# Patient Record
Sex: FEMALE | URBAN - METROPOLITAN AREA
[De-identification: names, ages, dates, MRNs, and addresses within clinical notes are randomized per-mention and may not be internally consistent; named-entity substitution may affect disease eponyms.]

---

## 2023-02-16 ENCOUNTER — OFFICE (OUTPATIENT)
Dept: URBAN - METROPOLITAN AREA CLINIC 26 | Facility: CLINIC | Age: 88
End: 2023-02-16

## 2023-02-16 VITALS
TEMPERATURE: 98.3 F | DIASTOLIC BLOOD PRESSURE: 76 MMHG | SYSTOLIC BLOOD PRESSURE: 150 MMHG | WEIGHT: 146 LBS | HEIGHT: 64 IN | HEART RATE: 71 BPM

## 2023-02-16 DIAGNOSIS — D62 ACUTE POSTHEMORRHAGIC ANEMIA: ICD-10-CM

## 2023-02-16 DIAGNOSIS — K44.9 DIAPHRAGMATIC HERNIA WITHOUT OBSTRUCTION OR GANGRENE: ICD-10-CM

## 2023-02-16 DIAGNOSIS — K22.6 GASTRO-ESOPHAGEAL LACERATION-HEMORRHAGE SYNDROME: ICD-10-CM

## 2023-02-16 PROCEDURE — 99204 OFFICE O/P NEW MOD 45 MIN: CPT | Performed by: INTERNAL MEDICINE

## 2023-04-05 LAB
ANION GAP IN SER/PLAS: 10 MMOL/L (ref 10–20)
CALCIUM (MG/DL) IN SER/PLAS: 9.4 MG/DL (ref 8.6–10.3)
CARBON DIOXIDE, TOTAL (MMOL/L) IN SER/PLAS: 27 MMOL/L (ref 21–32)
CHLORIDE (MMOL/L) IN SER/PLAS: 93 MMOL/L (ref 98–107)
CREATININE (MG/DL) IN SER/PLAS: 0.85 MG/DL (ref 0.5–1.05)
ERYTHROCYTE DISTRIBUTION WIDTH (RATIO) BY AUTOMATED COUNT: 12.4 % (ref 11.5–14.5)
ERYTHROCYTE MEAN CORPUSCULAR HEMOGLOBIN CONCENTRATION (G/DL) BY AUTOMATED: 32.6 G/DL (ref 32–36)
ERYTHROCYTE MEAN CORPUSCULAR VOLUME (FL) BY AUTOMATED COUNT: 91 FL (ref 80–100)
ERYTHROCYTES (10*6/UL) IN BLOOD BY AUTOMATED COUNT: 4.19 X10E12/L (ref 4–5.2)
GFR FEMALE: 66 ML/MIN/1.73M2
GLUCOSE (MG/DL) IN SER/PLAS: 92 MG/DL (ref 74–99)
HEMATOCRIT (%) IN BLOOD BY AUTOMATED COUNT: 38 % (ref 36–46)
HEMOGLOBIN (G/DL) IN BLOOD: 12.4 G/DL (ref 12–16)
LEUKOCYTES (10*3/UL) IN BLOOD BY AUTOMATED COUNT: 8.5 X10E9/L (ref 4.4–11.3)
PLATELETS (10*3/UL) IN BLOOD AUTOMATED COUNT: 264 X10E9/L (ref 150–450)
POTASSIUM (MMOL/L) IN SER/PLAS: 4.4 MMOL/L (ref 3.5–5.3)
SODIUM (MMOL/L) IN SER/PLAS: 126 MMOL/L (ref 136–145)
UREA NITROGEN (MG/DL) IN SER/PLAS: 17 MG/DL (ref 6–23)

## 2023-04-10 ENCOUNTER — OFFICE (OUTPATIENT)
Dept: URBAN - METROPOLITAN AREA PATHOLOGY 2 | Facility: PATHOLOGY | Age: 88
End: 2023-04-10
Payer: COMMERCIAL

## 2023-04-10 ENCOUNTER — AMBULATORY SURGICAL CENTER (OUTPATIENT)
Dept: URBAN - METROPOLITAN AREA SURGERY 12 | Facility: SURGERY | Age: 88
End: 2023-04-10
Payer: COMMERCIAL

## 2023-04-10 VITALS
SYSTOLIC BLOOD PRESSURE: 180 MMHG | DIASTOLIC BLOOD PRESSURE: 82 MMHG | HEART RATE: 68 BPM | HEIGHT: 64 IN | HEART RATE: 73 BPM | SYSTOLIC BLOOD PRESSURE: 151 MMHG | DIASTOLIC BLOOD PRESSURE: 80 MMHG | HEART RATE: 68 BPM | SYSTOLIC BLOOD PRESSURE: 151 MMHG | SYSTOLIC BLOOD PRESSURE: 180 MMHG | DIASTOLIC BLOOD PRESSURE: 82 MMHG | OXYGEN SATURATION: 93 % | HEART RATE: 65 BPM | SYSTOLIC BLOOD PRESSURE: 163 MMHG | TEMPERATURE: 97.3 F | OXYGEN SATURATION: 96 % | SYSTOLIC BLOOD PRESSURE: 140 MMHG | OXYGEN SATURATION: 99 % | RESPIRATION RATE: 18 BRPM | OXYGEN SATURATION: 96 % | RESPIRATION RATE: 12 BRPM | DIASTOLIC BLOOD PRESSURE: 95 MMHG | DIASTOLIC BLOOD PRESSURE: 95 MMHG | RESPIRATION RATE: 27 BRPM | HEART RATE: 71 BPM | RESPIRATION RATE: 12 BRPM | RESPIRATION RATE: 18 BRPM | SYSTOLIC BLOOD PRESSURE: 163 MMHG | DIASTOLIC BLOOD PRESSURE: 95 MMHG | HEART RATE: 71 BPM | OXYGEN SATURATION: 96 % | WEIGHT: 144 LBS | OXYGEN SATURATION: 98 % | OXYGEN SATURATION: 98 % | HEART RATE: 68 BPM | DIASTOLIC BLOOD PRESSURE: 82 MMHG | OXYGEN SATURATION: 99 % | HEART RATE: 65 BPM | OXYGEN SATURATION: 98 % | TEMPERATURE: 97.3 F | HEART RATE: 65 BPM | HEIGHT: 64 IN | DIASTOLIC BLOOD PRESSURE: 71 MMHG | OXYGEN SATURATION: 93 % | HEIGHT: 64 IN | RESPIRATION RATE: 19 BRPM | OXYGEN SATURATION: 93 % | DIASTOLIC BLOOD PRESSURE: 71 MMHG | SYSTOLIC BLOOD PRESSURE: 140 MMHG | WEIGHT: 144 LBS | DIASTOLIC BLOOD PRESSURE: 80 MMHG | DIASTOLIC BLOOD PRESSURE: 114 MMHG | HEART RATE: 86 BPM | WEIGHT: 144 LBS | RESPIRATION RATE: 12 BRPM | DIASTOLIC BLOOD PRESSURE: 71 MMHG | RESPIRATION RATE: 19 BRPM | HEART RATE: 86 BPM | RESPIRATION RATE: 27 BRPM | HEART RATE: 73 BPM | RESPIRATION RATE: 20 BRPM | DIASTOLIC BLOOD PRESSURE: 80 MMHG | SYSTOLIC BLOOD PRESSURE: 158 MMHG | RESPIRATION RATE: 19 BRPM | SYSTOLIC BLOOD PRESSURE: 158 MMHG | HEART RATE: 71 BPM | RESPIRATION RATE: 20 BRPM | DIASTOLIC BLOOD PRESSURE: 114 MMHG | HEART RATE: 86 BPM | DIASTOLIC BLOOD PRESSURE: 114 MMHG | SYSTOLIC BLOOD PRESSURE: 163 MMHG | TEMPERATURE: 97.3 F | OXYGEN SATURATION: 99 % | SYSTOLIC BLOOD PRESSURE: 180 MMHG | SYSTOLIC BLOOD PRESSURE: 158 MMHG | RESPIRATION RATE: 18 BRPM | RESPIRATION RATE: 20 BRPM | HEART RATE: 73 BPM | SYSTOLIC BLOOD PRESSURE: 140 MMHG | SYSTOLIC BLOOD PRESSURE: 151 MMHG | RESPIRATION RATE: 27 BRPM

## 2023-04-10 DIAGNOSIS — K21.00 GASTRO-ESOPHAGEAL REFLUX DISEASE WITH ESOPHAGITIS, WITHOUT B: ICD-10-CM

## 2023-04-10 DIAGNOSIS — K31.89 OTHER DISEASES OF STOMACH AND DUODENUM: ICD-10-CM

## 2023-04-10 DIAGNOSIS — K22.6 GASTRO-ESOPHAGEAL LACERATION-HEMORRHAGE SYNDROME: ICD-10-CM

## 2023-04-10 DIAGNOSIS — K44.9 DIAPHRAGMATIC HERNIA WITHOUT OBSTRUCTION OR GANGRENE: ICD-10-CM

## 2023-04-10 PROBLEM — K22.89 OTHER SPECIFIED DISEASE OF ESOPHAGUS: Status: ACTIVE | Noted: 2023-04-10

## 2023-04-10 PROCEDURE — 88342 IMHCHEM/IMCYTCHM 1ST ANTB: CPT | Mod: TC | Performed by: PATHOLOGY

## 2023-04-10 PROCEDURE — 88313 SPECIAL STAINS GROUP 2: CPT | Mod: TC | Performed by: PATHOLOGY

## 2023-04-10 PROCEDURE — 88305 TISSUE EXAM BY PATHOLOGIST: CPT | Mod: TC | Performed by: PATHOLOGY

## 2023-04-10 PROCEDURE — 43239 EGD BIOPSY SINGLE/MULTIPLE: CPT | Performed by: INTERNAL MEDICINE

## 2023-08-14 ENCOUNTER — LAB (OUTPATIENT)
Dept: LAB | Facility: LAB | Age: 88
End: 2023-08-14
Payer: MEDICARE

## 2023-08-14 LAB
ALBUMIN (G/DL) IN SER/PLAS: 4.2 G/DL (ref 3.4–5)
ANION GAP IN SER/PLAS: 14 MMOL/L (ref 10–20)
CALCIUM (MG/DL) IN SER/PLAS: 9.9 MG/DL (ref 8.6–10.6)
CARBON DIOXIDE, TOTAL (MMOL/L) IN SER/PLAS: 26 MMOL/L (ref 21–32)
CHLORIDE (MMOL/L) IN SER/PLAS: 94 MMOL/L (ref 98–107)
CREATININE (MG/DL) IN SER/PLAS: 0.87 MG/DL (ref 0.5–1.05)
GFR FEMALE: 64 ML/MIN/1.73M2
GLUCOSE (MG/DL) IN SER/PLAS: 91 MG/DL (ref 74–99)
PHOSPHATE (MG/DL) IN SER/PLAS: 4.7 MG/DL (ref 2.5–4.9)
POTASSIUM (MMOL/L) IN SER/PLAS: 4.8 MMOL/L (ref 3.5–5.3)
SODIUM (MMOL/L) IN SER/PLAS: 129 MMOL/L (ref 136–145)
UREA NITROGEN (MG/DL) IN SER/PLAS: 16 MG/DL (ref 6–23)

## 2023-09-05 ENCOUNTER — TELEPHONE (OUTPATIENT)
Dept: PRIMARY CARE | Facility: CLINIC | Age: 88
End: 2023-09-05

## 2023-09-05 DIAGNOSIS — I10 PRIMARY HYPERTENSION: Primary | ICD-10-CM

## 2023-09-05 PROBLEM — G72.9 MYOPATHY: Status: ACTIVE | Noted: 2023-09-05

## 2023-09-05 PROBLEM — L30.9 DERMATITIS, UNSPECIFIED: Status: ACTIVE | Noted: 2019-11-26

## 2023-09-05 PROBLEM — R73.9 ELEVATED BLOOD SUGAR: Status: ACTIVE | Noted: 2023-09-05

## 2023-09-05 PROBLEM — K22.6 MALLORY-WEISS TEAR: Status: ACTIVE | Noted: 2023-09-05

## 2023-09-05 PROBLEM — R21 RASH AND OTHER NONSPECIFIC SKIN ERUPTION: Status: ACTIVE | Noted: 2019-11-26

## 2023-09-05 PROBLEM — M19.019 ARTHRITIS OF SHOULDER REGION: Status: ACTIVE | Noted: 2023-09-05

## 2023-09-05 PROBLEM — M25.561 RIGHT KNEE PAIN: Status: ACTIVE | Noted: 2023-09-05

## 2023-09-05 PROBLEM — M85.80 OSTEOPENIA: Status: ACTIVE | Noted: 2023-09-05

## 2023-09-05 PROBLEM — M19.049 OSTEOARTHRITIS, HAND: Status: ACTIVE | Noted: 2023-09-05

## 2023-09-05 PROBLEM — R76.8 POSITIVE ANA (ANTINUCLEAR ANTIBODY): Status: ACTIVE | Noted: 2023-09-05

## 2023-09-05 PROBLEM — G62.9 PERIPHERAL POLYNEUROPATHY: Status: ACTIVE | Noted: 2023-09-05

## 2023-09-05 PROBLEM — S52.613A FRACTURE OF ULNAR STYLOID: Status: ACTIVE | Noted: 2022-01-28

## 2023-09-05 PROBLEM — J45.909 ASTHMA (HHS-HCC): Status: ACTIVE | Noted: 2023-09-05

## 2023-09-05 PROBLEM — E78.5 HYPERLIPIDEMIA: Status: ACTIVE | Noted: 2023-09-05

## 2023-09-05 PROBLEM — I48.0 PAROXYSMAL ATRIAL FIBRILLATION (MULTI): Status: ACTIVE | Noted: 2023-09-05

## 2023-09-05 PROBLEM — J30.9 ALLERGIC RHINITIS: Status: ACTIVE | Noted: 2023-09-05

## 2023-09-05 PROBLEM — K21.9 GASTROESOPHAGEAL REFLUX DISEASE: Status: ACTIVE | Noted: 2023-09-05

## 2023-09-05 PROBLEM — M17.0 PRIMARY OSTEOARTHRITIS OF BOTH KNEES: Status: ACTIVE | Noted: 2023-09-05

## 2023-09-05 PROBLEM — S52.509A DISTAL RADIAL FRACTURE: Status: ACTIVE | Noted: 2022-01-28

## 2023-09-05 PROBLEM — N18.30 CKD (CHRONIC KIDNEY DISEASE), STAGE III (MULTI): Status: ACTIVE | Noted: 2023-09-05

## 2023-09-05 PROBLEM — D64.9 ANEMIA: Status: ACTIVE | Noted: 2023-09-05

## 2023-09-05 RX ORDER — METOPROLOL SUCCINATE 25 MG/1
TABLET, EXTENDED RELEASE ORAL 2 TIMES DAILY
COMMUNITY
Start: 2020-02-10 | End: 2024-01-11 | Stop reason: SDUPTHER

## 2023-09-05 RX ORDER — LISINOPRIL 10 MG/1
1 TABLET ORAL DAILY
COMMUNITY
Start: 2019-10-30 | End: 2023-09-05 | Stop reason: SDUPTHER

## 2023-09-05 RX ORDER — LISINOPRIL 10 MG/1
10 TABLET ORAL DAILY
Qty: 90 TABLET | Refills: 3 | Status: SHIPPED | OUTPATIENT
Start: 2023-09-05 | End: 2024-09-04

## 2023-09-05 RX ORDER — CALCIUM CARBONATE 600 MG
TABLET ORAL
COMMUNITY
Start: 2017-10-10

## 2023-09-05 RX ORDER — ASPIRIN 81 MG/1
1 TABLET ORAL DAILY
COMMUNITY
Start: 2017-10-10 | End: 2025-04-06

## 2023-09-05 RX ORDER — CHOLECALCIFEROL (VITAMIN D3) 125 MCG
CAPSULE ORAL
COMMUNITY
Start: 2020-03-30

## 2023-09-05 RX ORDER — CLOPIDOGREL BISULFATE 75 MG/1
75 TABLET ORAL DAILY
COMMUNITY
End: 2024-01-22 | Stop reason: ALTCHOICE

## 2023-09-05 RX ORDER — PANTOPRAZOLE SODIUM 40 MG/1
1 TABLET, DELAYED RELEASE ORAL DAILY
COMMUNITY

## 2023-09-05 RX ORDER — APIXABAN 2.5 MG/1
2.5 TABLET, FILM COATED ORAL 2 TIMES DAILY
COMMUNITY
End: 2023-09-19 | Stop reason: WASHOUT

## 2023-09-05 NOTE — TELEPHONE ENCOUNTER
Pt called for a med refill. Pt needs a refill on her lisinopril. Pt uses [facility]. Pt's next office visit 9/19/23. Pt's chart need abstracted.

## 2023-09-18 NOTE — PROGRESS NOTES
"Subjective   Patient ID: Camille Hatfield is a 88 y.o. female who presents for Follow-up (Pt presents for 6 month follow up- pt states no concerns at this time.) and Medicare Annual Wellness Visit Subsequent (Pt presents for MWV- ABN given to pt and signed, pt verbalized understanding.).    HPI     Patient presents today for routine FUV + MWV  Patient is doing well overall, other than some leg discomfort.    She experiences the leg discomfort mostly at night.  Diagnosed with neuropathy about 4-5 years ago, unsure etiology, possibly idiopathic.  Describes as burning sensation, feels \"like she is on fire\".  She does exercises regularly.  She tried gabapentin in the past but was not effective and caused some sedation.  She would like something to \"help\" this not just hide it.  Some nights she can sleep without issues.  She has never tried amitriptyline, duloxetine, or Lyrica.  She would rather take something for sleep if needed and not the pain.    MEDICARE WELLNESS VISIT   TDAP: none found  SHINGRIX: none found  PNEUMOVAX: completed  PAP: none found - graduated  MAMMO: none found - graduated  CSCOPE: none found - graduated  DEXA: 4/2018 osteopenia bilateral femoral necks (ordered 9/2023)  HEP C SCREEN: none found  CACS: none found  LIPID: 1/2020 TC/HDL ratio 5.4,     Diet: eats lots of veggies  Exercise: does some sort of activity or exercise everyday, uses her counter for support.    Dental visits up to date.  Vision screening up to date.    Patient has living will and POA.   She does have HC-POA, this is one of her sons.  She is DNR, has discussed her wishes with her family.    Patient declines influenza vaccine.  Patient to inquire at pharmacy about Shingrix vaccine.  Patient to inquire at pharmacy about TDAP vaccine.    Bone density screening:   Hx of osteoporosis, most recent DEXA showed osteopenia, see below.  Patient is supplementing calcium or vitamin D.    Breast cancer screening: GRADUATED  Denies " lumps/bumps, skin changes, nipple retraction, or nipple drainage.    Cervical cancer screening: GRADUATED  Denies pelvic pain, vaginal discharge, or vaginal bleeding.    Colon cancer screening: GRADUATED  Denies melena, hematochezia, constipation, diarrhea, bloating, change in bowel habits.    CHRONIC CONDITIONS:   -Hypokalemia  8/2023 Na 129, prior Na 126 in 4/2023    -Paroxysmal atrial fibrillation  Managed by cardiology, Dr. Ramicone.  Presently Metoprolol 25 mg BID  Previously on Xarelto and Eliquis, discontinued 1/2023 due to massive GI bleeding requiring 1 unit of blood transfusion.  Her CHADS-VASC score is 4 and HAS-BLED score is 3.   She is at increased risk of both bleeding and stroke.   As such she is a reasonable candidate for consideration of left atrial appendage occluder placement.     4/18/2023 s/p successful Transcatheter Left Atrial Appendage Closure with 27 mm Watchman FLX device.  Recapture was not required.    Compression rate was 17.5%.    2 sheathes were inserted in RFV, and hemostasis was achieved with Vascades x2.   Follow up TTE showed no evidence of new pericardial effusion or device-related thrombus.   Patient will be discharged with DAPT, followed by aspirin for life.   CTA at 4 months is required to reassess device positioning and rule out any device leak or device related thrombus.    Had recheck CT done 8/2023.  To come off Plavix in 2 weeks.  No bleeding but some bruising, nothing concerning.    -HTN  Taking Lisinopril 10 mg + Metoprolol 25 mg BID  She takes all meds in the morning.  States BP is higher in the morning, goes into 90s systolic in PM.  Does get some dizziness with standing, always later in the day.  Highest BP she has gotten at home was in 140s.  Tried cutting down to 1/2 tablet Lisinopril but was still high in the morning.     -HLD  Lifestyle managed, unable to tolerate statins in past.  No CACS in our system.  1/2020 TC/HDL ratio 5.4,      -CKD, stage  III  8/2023 GFR 64, creatinine 0.87, Na 129 (L), K 4.8  No US kidneys in our records.     -Osteopenia  Taking vitamin D and calcium.  Hx of osteoporosis, she was on infusion, Reclast, for about 2 years around 2009/2010.  She was intolerant to Fosamax in remote past, due to GI upset.  Last DEXA done in 2018, patient previously declined further screenings as she would not take treatment medications if needed.    -Bilateral knee pain  Prior x-rays have proven osteoarthritis.  Previously on blood thinner could not use NSAIDs (s/p watchman 4/2023).  Patient does a great deal of stretching and strengthening at home.  6/2022 bilateral knee injections administered  She is taking Tylenol, scheduled dosing and an OTC topical.  No relief with Voltaren gel in past.    Review of Systems   All other systems reviewed and are negative.      Objective   /72 (BP Location: Left arm, Patient Position: Sitting, BP Cuff Size: Small adult)   Pulse 68   Temp 36.5 °C (97.7 °F) (Temporal)   Resp 12   Wt 64.5 kg (142 lb 1.6 oz)   SpO2 97%   BMI 24.39 kg/m²     Physical Exam  Vitals and nursing note reviewed.   Constitutional:       General: She is not in acute distress.     Appearance: Normal appearance. She is not toxic-appearing.   HENT:      Head: Normocephalic and atraumatic.   Neck:      Thyroid: No thyromegaly.   Cardiovascular:      Rate and Rhythm: Normal rate and regular rhythm.      Heart sounds: No murmur heard.     No friction rub. No gallop.   Pulmonary:      Effort: Pulmonary effort is normal.      Breath sounds: Normal breath sounds. No wheezing, rhonchi or rales.   Abdominal:      General: Bowel sounds are normal. There is no distension.      Palpations: Abdomen is soft. There is no mass.      Tenderness: There is no abdominal tenderness. There is no guarding.   Lymphadenopathy:      Cervical: No cervical adenopathy.   Skin:     General: Skin is warm and dry.   Neurological:      General: No focal deficit present.       Mental Status: She is alert and oriented to person, place, and time.   Psychiatric:         Mood and Affect: Mood normal.         Behavior: Behavior normal.         Assessment/Plan   Hypertension  BP is 136/72 in office today, adequate control.  Goal BP is 130/80 or less, ideally 120/80 or less.   Because of some hypotension in the evenings I recommend she split dose the Lisinopril as well as the Metoprolol which she is already doing based on prescription per cardio.    Continue Lisinopril 10 mg daily - take 1/2 tablet (5 mg) in AM and 1/2 tablet (5 mg) in PM.  Continue Metoprolol 25 mg twice daily.    If hypotension persists or hypokalemia worsens will change the Lisinopril to alternative agent.  Repeat BMP ordered today.    Hyperlipidemia  1/2020 TC/HDL ratio 5.4,   Goal TC/HDL ratio 3.4 or less, LDL 99 or less,  or less, and TRIG 150 or less.  Lifestyle managed, unable to tolerate statins in past.  Diet and exercise recommendations revisited, see wellness A/P.    Anemia  Anemia 2/2 GI bleed on anticoagulation therapy (this was discontinued in 1/2023).  S/p watchmen in 4/2023 for a-fib.  4/2023 CBC normal, anemia resolved.    Idiopathic peripheral neuropathy  Idopathic neuropathy, not responsive to Gabapentin in the past.  Most bothersome at night, causes insomnia intermittently.  Start trial with Nortriptyline, 30 day prescription sent to local pharmacy.  If tolerated well and effective can refill for 90 days.    Medicare annual wellness visit, subsequent  Vaccines and screenings reviewed.  Questionnaires completed.  Health and wellness topics reviewed.  Diet and exercise recommendations revisited.  Advanced directives reviewed, patient is DNR, has living will and POA, family aware of wishes.  Routine blood work ordered today.    Patient has aged out of routine breast, cervical, and colon cancer screenings, she continues to defer.  Bone density screening (DEXA) discussed, repeat ordered today,  see Osteopenia A/P.    Flu vaccine recommended, defers today.    TDAP booster recommended, patient can get at pharmacy, no prescription needed.    Shingles vaccine (Shingrix) is strongly encourage - can get at pharmacy - no prescription needed.  This should be covered starting January 2023 with Medicare per Executive Order.  This is to be administered in 2 separate doses, 2- 6 months apart.   This is a killed virus vaccine, so no risk of chicken pox or shingles with administration.   The vaccine is approximately 90 % effective to protect against shingles even 5 years out.   Side effects of the vaccine can include soreness of the arm at administration site and possible flu-like symptoms after administration.     LIFESTYLE MEASURES  -make sure you are avoiding refined carbs such as breads, pasta, cereal, candy, soda,  nutrition bars, granola, chips, and sugar sweetened beverages.      -eat 5- 7 servings daily of veggies,  healthy protein such as chicken, fish,  beans, and eggs, and include healthy fats in your diet such as seeds, nuts, olive oil, avocados, and salmon.   -exercise 4 - 6 days per week as you are able, 150 minutes total weekly divided up is recommended.  -Vitamin D is recommended at 1000 - 5000 IU international units daily.   -Always wear sunscreen when you have sun exposure.  -64 oz of water is recommended daily.  -Dental visits recommended every 6 months.  -Eye exam recommended every 2 years, for those with vision problems every year.      Osteopenia  Hx of osteoporosis, she was on infusion, Reclast, for about 2 years around 2009/2010.  She was intolerant to Fosamax in remote past, due to GI upset.  Last DEXA done in 2018, which showed osteopenia but not osteoporosis.  Repeat DEXA ordered today.  Recommend weightbearing exercise such as walking 4-6 days per week 30 minutes on those days.   Other strengthening exercises such as yoga and lifting weights can also be helpful.  Take vitamin D at 1000 IU  daily and calcium at 1500 mg daily.    Paroxysmal atrial fibrillation (CMS/HCC)  Stable, doing well since having the Watchman procedure 4/18/2023.  She continues with Plavix at this time, states she is due to discontinue this in 2 weeks per cardio.  Patient to continue with aspirin life-long.  She should follow-up with cardiology as they have directed.  She should continue the Metoprolol 25 mg BID.    CKD (chronic kidney disease), stage III (CMS/HCC)  8/2023 GFR 64, creatinine 0.87, this is baseline for patient.  Will continue to monitor on regular basis.  Revisited good BP control, avoiding NSAIDs, and adequate hydration as measures to further protect kidneys.     Insomnia due to medical condition  Secondary to neuropathy, will start on trial of Nortriptyline, prescription sent to pharmacy.    Hypokalemia  Repeat BMP ordered today, if Na <125 will need to change the Lisinopril.  Patient will be contacted with results.   Follow-up 3-4 months for recheck neuropathy/lab review.  Labs to be done prior.  Call for sooner follow-up if needed.     Scribe Attestation  By signing my name below, Kortney LÓPEZ Scribe   attest that this documentation has been prepared under the direction and in the presence of Arelis Joyner DO.

## 2023-09-19 ENCOUNTER — OFFICE VISIT (OUTPATIENT)
Dept: PRIMARY CARE | Facility: CLINIC | Age: 88
End: 2023-09-19
Payer: MEDICARE

## 2023-09-19 VITALS
DIASTOLIC BLOOD PRESSURE: 72 MMHG | WEIGHT: 142.1 LBS | SYSTOLIC BLOOD PRESSURE: 136 MMHG | BODY MASS INDEX: 24.39 KG/M2 | RESPIRATION RATE: 12 BRPM | HEART RATE: 68 BPM | OXYGEN SATURATION: 97 % | TEMPERATURE: 97.7 F

## 2023-09-19 DIAGNOSIS — M85.80 OSTEOPENIA, UNSPECIFIED LOCATION: ICD-10-CM

## 2023-09-19 DIAGNOSIS — I10 HYPERTENSION, UNSPECIFIED TYPE: ICD-10-CM

## 2023-09-19 DIAGNOSIS — E87.6 HYPOKALEMIA: ICD-10-CM

## 2023-09-19 DIAGNOSIS — E78.5 HYPERLIPIDEMIA, UNSPECIFIED HYPERLIPIDEMIA TYPE: ICD-10-CM

## 2023-09-19 DIAGNOSIS — G47.01 INSOMNIA DUE TO MEDICAL CONDITION: ICD-10-CM

## 2023-09-19 DIAGNOSIS — G60.9 IDIOPATHIC PERIPHERAL NEUROPATHY: ICD-10-CM

## 2023-09-19 DIAGNOSIS — Z00.00 MEDICARE ANNUAL WELLNESS VISIT, SUBSEQUENT: Primary | ICD-10-CM

## 2023-09-19 DIAGNOSIS — Z78.0 POSTMENOPAUSAL ESTROGEN DEFICIENCY: ICD-10-CM

## 2023-09-19 DIAGNOSIS — N18.31 STAGE 3A CHRONIC KIDNEY DISEASE (MULTI): ICD-10-CM

## 2023-09-19 DIAGNOSIS — I48.0 PAROXYSMAL ATRIAL FIBRILLATION (MULTI): ICD-10-CM

## 2023-09-19 DIAGNOSIS — Z13.89 ENCOUNTER FOR SCREENING FOR OTHER DISORDER: ICD-10-CM

## 2023-09-19 PROBLEM — R21 RASH AND OTHER NONSPECIFIC SKIN ERUPTION: Status: RESOLVED | Noted: 2019-11-26 | Resolved: 2023-09-19

## 2023-09-19 PROBLEM — L30.9 DERMATITIS, UNSPECIFIED: Status: RESOLVED | Noted: 2019-11-26 | Resolved: 2023-09-19

## 2023-09-19 PROBLEM — D64.9 ANEMIA: Status: RESOLVED | Noted: 2023-09-05 | Resolved: 2023-09-19

## 2023-09-19 PROBLEM — G72.9 MYOPATHY: Status: RESOLVED | Noted: 2023-09-05 | Resolved: 2023-09-19

## 2023-09-19 PROCEDURE — G0444 DEPRESSION SCREEN ANNUAL: HCPCS | Performed by: FAMILY MEDICINE

## 2023-09-19 PROCEDURE — 1160F RVW MEDS BY RX/DR IN RCRD: CPT | Performed by: FAMILY MEDICINE

## 2023-09-19 PROCEDURE — 3075F SYST BP GE 130 - 139MM HG: CPT | Performed by: FAMILY MEDICINE

## 2023-09-19 PROCEDURE — 1159F MED LIST DOCD IN RCRD: CPT | Performed by: FAMILY MEDICINE

## 2023-09-19 PROCEDURE — 99213 OFFICE O/P EST LOW 20 MIN: CPT | Performed by: FAMILY MEDICINE

## 2023-09-19 PROCEDURE — 1170F FXNL STATUS ASSESSED: CPT | Performed by: FAMILY MEDICINE

## 2023-09-19 PROCEDURE — G0439 PPPS, SUBSEQ VISIT: HCPCS | Performed by: FAMILY MEDICINE

## 2023-09-19 PROCEDURE — 3078F DIAST BP <80 MM HG: CPT | Performed by: FAMILY MEDICINE

## 2023-09-19 PROCEDURE — 1036F TOBACCO NON-USER: CPT | Performed by: FAMILY MEDICINE

## 2023-09-19 PROCEDURE — 1126F AMNT PAIN NOTED NONE PRSNT: CPT | Performed by: FAMILY MEDICINE

## 2023-09-19 RX ORDER — NORTRIPTYLINE HYDROCHLORIDE 10 MG/1
10 CAPSULE ORAL NIGHTLY
Qty: 30 CAPSULE | Refills: 5 | Status: SHIPPED | OUTPATIENT
Start: 2023-09-19 | End: 2024-03-17

## 2023-09-19 ASSESSMENT — PATIENT HEALTH QUESTIONNAIRE - PHQ9
1. LITTLE INTEREST OR PLEASURE IN DOING THINGS: NOT AT ALL
2. FEELING DOWN, DEPRESSED OR HOPELESS: NOT AT ALL
SUM OF ALL RESPONSES TO PHQ9 QUESTIONS 1 AND 2: 0

## 2023-09-19 ASSESSMENT — ACTIVITIES OF DAILY LIVING (ADL)
GROCERY_SHOPPING: INDEPENDENT
TAKING_MEDICATION: INDEPENDENT
BATHING: INDEPENDENT
DOING_HOUSEWORK: INDEPENDENT
DRESSING: INDEPENDENT
MANAGING_FINANCES: INDEPENDENT

## 2023-09-19 NOTE — ASSESSMENT & PLAN NOTE
Anemia 2/2 GI bleed on anticoagulation therapy (this was discontinued in 1/2023).  S/p watchmen in 4/2023 for a-fib.  4/2023 CBC normal, anemia resolved.

## 2023-09-19 NOTE — PATIENT INSTRUCTIONS
Hypertension  BP is 136/72 in office today, adequate control.  Goal BP is 130/80 or less, ideally 120/80 or less.   Because of some hypotension in the evenings I recommend she split dose the Lisinopril as well as the Metoprolol which she is already doing based on prescription per cardio.    Continue Lisinopril 10 mg daily - take 1/2 tablet (5 mg) in AM and 1/2 tablet (5 mg) in PM.  Continue Metoprolol 25 mg twice daily.    If hypotension persists or hypokalemia worsens will change the Lisinopril to alternative agent.  Repeat BMP ordered today.    Hyperlipidemia  1/2020 TC/HDL ratio 5.4,   Goal TC/HDL ratio 3.4 or less, LDL 99 or less,  or less, and TRIG 150 or less.  Lifestyle managed, unable to tolerate statins in past.  Diet and exercise recommendations revisited, see wellness A/P.    Anemia  Anemia 2/2 GI bleed on anticoagulation therapy (this was discontinued in 1/2023).  S/p watchmen in 4/2023 for a-fib.  4/2023 CBC normal, anemia resolved.    Idiopathic peripheral neuropathy  Idopathic neuropathy, not responsive to Gabapentin in the past.  Most bothersome at night, causes insomnia intermittently.  Start trial with Nortriptyline, 30 day prescription sent to local pharmacy.  If tolerated well and effective can refill for 90 days.    Medicare annual wellness visit, subsequent  Vaccines and screenings reviewed.  Questionnaires completed.  Health and wellness topics reviewed.  Diet and exercise recommendations revisited.  Advanced directives reviewed, patient is DNR, has living will and POA, family aware of wishes.  Routine blood work ordered today.    Patient has aged out of routine breast, cervical, and colon cancer screenings, she continues to defer.  Bone density screening (DEXA) discussed, repeat ordered today, see Osteopenia A/P.    Flu vaccine recommended, defers today.    TDAP booster recommended, patient can get at pharmacy, no prescription needed.    Shingles vaccine (Shingrix) is strongly  encourage - can get at pharmacy - no prescription needed.  This should be covered starting January 2023 with Medicare per Executive Order.  This is to be administered in 2 separate doses, 2- 6 months apart.   This is a killed virus vaccine, so no risk of chicken pox or shingles with administration.   The vaccine is approximately 90 % effective to protect against shingles even 5 years out.   Side effects of the vaccine can include soreness of the arm at administration site and possible flu-like symptoms after administration.     LIFESTYLE MEASURES  -make sure you are avoiding refined carbs such as breads, pasta, cereal, candy, soda,  nutrition bars, granola, chips, and sugar sweetened beverages.      -eat 5- 7 servings daily of veggies,  healthy protein such as chicken, fish,  beans, and eggs, and include healthy fats in your diet such as seeds, nuts, olive oil, avocados, and salmon.   -exercise 4 - 6 days per week as you are able, 150 minutes total weekly divided up is recommended.  -Vitamin D is recommended at 1000 - 5000 IU international units daily.   -Always wear sunscreen when you have sun exposure.  -64 oz of water is recommended daily.  -Dental visits recommended every 6 months.  -Eye exam recommended every 2 years, for those with vision problems every year.      Osteopenia  Hx of osteoporosis, she was on infusion, Reclast, for about 2 years around 2009/2010.  She was intolerant to Fosamax in remote past, due to GI upset.  Last DEXA done in 2018, which showed osteopenia but not osteoporosis.  Repeat DEXA ordered today.  Recommend weightbearing exercise such as walking 4-6 days per week 30 minutes on those days.   Other strengthening exercises such as yoga and lifting weights can also be helpful.  Take vitamin D at 1000 IU daily and calcium at 1500 mg daily.    Paroxysmal atrial fibrillation (CMS/HCC)  Stable, doing well since having the Watchman procedure 4/18/2023.  She continues with Plavix at this time,  states she is due to discontinue this in 2 weeks per cardio.  Patient to continue with aspirin life-long.  She should follow-up with cardiology as they have directed.  She should continue the Metoprolol 25 mg BID.    CKD (chronic kidney disease), stage III (CMS/[facility])  8/2023 GFR 64, creatinine 0.87, this is baseline for patient.  Will continue to monitor on regular basis.  Revisited good BP control, avoiding NSAIDs, and adequate hydration as measures to further protect kidneys.     Insomnia due to medical condition  Secondary to neuropathy, will start on trial of Nortriptyline, prescription sent to pharmacy.    Hypokalemia  Repeat BMP ordered today, if Na <125 will need to change the Lisinopril.  Patient will be contacted with results.

## 2023-09-19 NOTE — ASSESSMENT & PLAN NOTE
Repeat BMP ordered today, if Na <125 will need to change the Lisinopril.  Patient will be contacted with results.

## 2023-09-19 NOTE — ASSESSMENT & PLAN NOTE
Vaccines and screenings reviewed.  Questionnaires completed.  Health and wellness topics reviewed.  Diet and exercise recommendations revisited.  Advanced directives reviewed, patient is DNR, has living will and POA, family aware of wishes.  Routine blood work ordered today.    Patient has aged out of routine breast, cervical, and colon cancer screenings, she continues to defer.  Bone density screening (DEXA) discussed, repeat ordered today, see Osteopenia A/P.    Flu vaccine recommended, defers today.    TDAP booster recommended, patient can get at pharmacy, no prescription needed.    Shingles vaccine (Shingrix) is strongly encourage - can get at pharmacy - no prescription needed.  This should be covered starting January 2023 with Medicare per Executive Order.  This is to be administered in 2 separate doses, 2- 6 months apart.   This is a killed virus vaccine, so no risk of chicken pox or shingles with administration.   The vaccine is approximately 90 % effective to protect against shingles even 5 years out.   Side effects of the vaccine can include soreness of the arm at administration site and possible flu-like symptoms after administration.     LIFESTYLE MEASURES  -make sure you are avoiding refined carbs such as breads, pasta, cereal, candy, soda,  nutrition bars, granola, chips, and sugar sweetened beverages.      -eat 5- 7 servings daily of veggies,  healthy protein such as chicken, fish,  beans, and eggs, and include healthy fats in your diet such as seeds, nuts, olive oil, avocados, and salmon.   -exercise 4 - 6 days per week as you are able, 150 minutes total weekly divided up is recommended.  -Vitamin D is recommended at 1000 - 5000 IU international units daily.   -Always wear sunscreen when you have sun exposure.  -64 oz of water is recommended daily.  -Dental visits recommended every 6 months.  -Eye exam recommended every 2 years, for those with vision problems every year.

## 2023-09-19 NOTE — ASSESSMENT & PLAN NOTE
Idopathic neuropathy, not responsive to Gabapentin in the past.  Most bothersome at night, causes insomnia intermittently.  Start trial with Nortriptyline, 30 day prescription sent to local pharmacy.  If tolerated well and effective can refill for 90 days.

## 2023-09-19 NOTE — ASSESSMENT & PLAN NOTE
Stable, doing well since having the Watchman procedure 4/18/2023.  She continues with Plavix at this time, states she is due to discontinue this in 2 weeks per cardio.  Patient to continue with aspirin life-long.  She should follow-up with cardiology as they have directed.  She should continue the Metoprolol 25 mg BID.

## 2023-09-19 NOTE — ASSESSMENT & PLAN NOTE
BP is 136/72 in office today, adequate control.  Goal BP is 130/80 or less, ideally 120/80 or less.   Because of some hypotension in the evenings I recommend she split dose the Lisinopril as well as the Metoprolol which she is already doing based on prescription per cardio.    Continue Lisinopril 10 mg daily - take 1/2 tablet (5 mg) in AM and 1/2 tablet (5 mg) in PM.  Continue Metoprolol 25 mg twice daily.    If hypotension persists or hypokalemia worsens will change the Lisinopril to alternative agent.  Repeat BMP ordered today.

## 2023-09-19 NOTE — ASSESSMENT & PLAN NOTE
8/2023 GFR 64, creatinine 0.87, this is baseline for patient.  Will continue to monitor on regular basis.  Revisited good BP control, avoiding NSAIDs, and adequate hydration as measures to further protect kidneys.

## 2023-09-19 NOTE — ASSESSMENT & PLAN NOTE
Hx of osteoporosis, she was on infusion, Reclast, for about 2 years around 2009/2010.  She was intolerant to Fosamax in remote past, due to GI upset.  Last DEXA done in 2018, which showed osteopenia but not osteoporosis.  Repeat DEXA ordered today.  Recommend weightbearing exercise such as walking 4-6 days per week 30 minutes on those days.   Other strengthening exercises such as yoga and lifting weights can also be helpful.  Take vitamin D at 1000 IU daily and calcium at 1500 mg daily.

## 2024-01-10 DIAGNOSIS — I48.0 PAROXYSMAL ATRIAL FIBRILLATION (MULTI): ICD-10-CM

## 2024-01-11 RX ORDER — METOPROLOL SUCCINATE 25 MG/1
25 TABLET, EXTENDED RELEASE ORAL 2 TIMES DAILY
Qty: 180 TABLET | Refills: 3 | Status: SHIPPED | OUTPATIENT
Start: 2024-01-11

## 2024-01-22 ENCOUNTER — OFFICE VISIT (OUTPATIENT)
Dept: CARDIOLOGY | Facility: CLINIC | Age: 89
End: 2024-01-22
Payer: MEDICARE

## 2024-01-22 VITALS
OXYGEN SATURATION: 97 % | SYSTOLIC BLOOD PRESSURE: 149 MMHG | BODY MASS INDEX: 24.41 KG/M2 | HEART RATE: 67 BPM | DIASTOLIC BLOOD PRESSURE: 78 MMHG | WEIGHT: 143 LBS | HEIGHT: 64 IN

## 2024-01-22 DIAGNOSIS — I48.0 PAROXYSMAL ATRIAL FIBRILLATION (MULTI): Primary | ICD-10-CM

## 2024-01-22 PROCEDURE — 99213 OFFICE O/P EST LOW 20 MIN: CPT | Performed by: INTERNAL MEDICINE

## 2024-01-22 PROCEDURE — 3078F DIAST BP <80 MM HG: CPT | Performed by: INTERNAL MEDICINE

## 2024-01-22 PROCEDURE — 1036F TOBACCO NON-USER: CPT | Performed by: INTERNAL MEDICINE

## 2024-01-22 PROCEDURE — 1159F MED LIST DOCD IN RCRD: CPT | Performed by: INTERNAL MEDICINE

## 2024-01-22 PROCEDURE — 1160F RVW MEDS BY RX/DR IN RCRD: CPT | Performed by: INTERNAL MEDICINE

## 2024-01-22 PROCEDURE — 1126F AMNT PAIN NOTED NONE PRSNT: CPT | Performed by: INTERNAL MEDICINE

## 2024-01-22 PROCEDURE — 3077F SYST BP >= 140 MM HG: CPT | Performed by: INTERNAL MEDICINE

## 2024-01-22 ASSESSMENT — ENCOUNTER SYMPTOMS
LOSS OF SENSATION IN FEET: 0
DEPRESSION: 0
OCCASIONAL FEELINGS OF UNSTEADINESS: 0

## 2024-01-22 NOTE — PROGRESS NOTES
"History Of Present Illness:      This is an 88-year-old female with history of atrial fibrillation.  She has no complaints of palpitations, chest pain, or shortness of breath.    In January 2023 she had a massive GI bleed while in [place] and has been off anticoagulation.  She had left atrial appendage closure April 18, 2023.    Review of Systems  Other review of systems negative     Last Recorded Vitals:      1/30/2023     1:43 PM 3/13/2023    10:04 AM 4/5/2023     1:57 PM 4/5/2023     1:59 PM 5/10/2023     9:59 AM 9/19/2023    10:32 AM 1/22/2024    10:31 AM   Vitals   Systolic 130 168 166 205 143 136 149   Diastolic 70 81 84 104 84 72 78   Heart Rate 81 69 71  80 68 67   Temp 36.7 °C (98 °F)  36.3 °C (97.3 °F)   36.5 °C (97.7 °F)    Resp 14  16   12    Height (in)  1.588 m (5' 2.5\") 1.626 m (5' 4\")  1.626 m (5' 4\")  1.626 m (5' 4\")   Weight (lb) 143 143 144  141 142.1 143   BMI 25.74 kg/m2 25.74 kg/m2 24.72 kg/m2  24.2 kg/m2 24.39 kg/m2 24.55 kg/m2   BSA (m2) 1.69 m2 1.69 m2 1.72 m2  1.7 m2 1.71 m2 1.71 m2   Visit Report      Report Report          Allergies:  Alendronate, Quinidine, Statins-hmg-coa reductase inhibitors, Sulfa (sulfonamide antibiotics), Codeine, and Penicillins    Outpatient Medications:  Current Outpatient Medications   Medication Instructions    aspirin 81 mg EC tablet 1 tablet, oral, Daily    beclomethasone HFA (Qvar) 40 mcg/actuation inhaler inhalation, Every 12 hours    calcium carbonate 600 mg calcium (1,500 mg) tablet oral    cholecalciferol (Vitamin D-3) 125 MCG (5000 UT) capsule oral    lisinopril 10 mg, oral, Daily    metoprolol succinate XL (TOPROL-XL) 25 mg, oral, 2 times daily    nortriptyline (PAMELOR) 10 mg, oral, Nightly    pantoprazole (ProtoNix) 40 mg EC tablet 1 tablet, oral, Daily       Physical Exam:    General Appearance:  Alert, oriented, no distress  Skin:  Warm and dry  Head and Neck:  No elevation of JVP, no carotid bruits  Cardiac Exam:  Rhythm is regular, S1 and S2 are " normal, no murmur S3 or S4  Lungs:  Clear to auscultation  Extremities:  no edema  Neurologic:  No focal deficits  Psychiatric:  Appropriate mood and behavior         Cardiology Tests:  I have personally review the diagnostic cardiac testing and my interpretation is as follows:    Echocardiogram February 2020: Normal left ventricular function with mild to moderate aortic valve regurgitation      Assessment/Plan   Problem List Items Addressed This Visit             ICD-10-CM    Paroxysmal atrial fibrillation (CMS/HCC) - Primary I48.0     1.  Paroxysmal atrial fibrillation: The patient is in sinus rhythm today, and has had no symptomatic episodes of atrial fibrillation.  She had a massive GI bleed in January 2023 secondary to a Ema-Jay tear while she was in [place].  Left atrial appendage closure with watchman was performed April 18, 2023.  Continue same medication regimen and follow-up in 10 to 12 months.    2.  Hypertension: The patient is monitoring her blood pressure readings at home and occasionally she is hypotensive.  We will therefore keep the lisinopril at the same dosage for now.            James C Ramicone, DO

## 2024-01-22 NOTE — ASSESSMENT & PLAN NOTE
1.  Paroxysmal atrial fibrillation: The patient is in sinus rhythm today, and has had no symptomatic episodes of atrial fibrillation.  She had a massive GI bleed in January 2023 secondary to a Ema-Jay tear while she was in [place].  Left atrial appendage closure with watchman was performed April 18, 2023.  Continue same medication regimen and follow-up in 10 to 12 months.    2.  Hypertension: The patient is monitoring her blood pressure readings at home and occasionally she is hypotensive.  We will therefore keep the lisinopril at the same dosage for now.

## 2024-01-23 ENCOUNTER — APPOINTMENT (OUTPATIENT)
Dept: PRIMARY CARE | Facility: CLINIC | Age: 89
End: 2024-01-23
Payer: MEDICARE

## 2024-02-12 ENCOUNTER — TELEPHONE (OUTPATIENT)
Dept: CARDIOLOGY | Facility: HOSPITAL | Age: 89
End: 2024-02-12
Payer: MEDICARE

## 2024-06-25 DIAGNOSIS — I48.0 PAROXYSMAL ATRIAL FIBRILLATION (MULTI): ICD-10-CM

## 2024-06-27 RX ORDER — PANTOPRAZOLE SODIUM 40 MG/1
40 TABLET, DELAYED RELEASE ORAL DAILY
Qty: 90 TABLET | Refills: 3 | Status: SHIPPED | OUTPATIENT
Start: 2024-06-27

## 2024-08-02 DIAGNOSIS — I10 PRIMARY HYPERTENSION: ICD-10-CM

## 2024-08-02 RX ORDER — LISINOPRIL 10 MG/1
10 TABLET ORAL DAILY
Qty: 90 TABLET | Refills: 3 | Status: SHIPPED | OUTPATIENT
Start: 2024-08-02

## 2024-10-07 ENCOUNTER — APPOINTMENT (OUTPATIENT)
Dept: PRIMARY CARE | Facility: CLINIC | Age: 89
End: 2024-10-07
Payer: MEDICARE

## 2024-10-21 PROBLEM — I85.00 ESOPHAGEAL VARICES WITHOUT BLEEDING (MULTI): Status: ACTIVE | Noted: 2024-10-21

## 2024-11-07 PROBLEM — I48.0 PAROXYSMAL ATRIAL FIBRILLATION (MULTI): Chronic | Status: ACTIVE | Noted: 2023-09-05

## 2024-11-08 ENCOUNTER — APPOINTMENT (OUTPATIENT)
Dept: CARDIOLOGY | Facility: CLINIC | Age: 89
End: 2024-11-08
Payer: MEDICARE

## 2024-11-20 ENCOUNTER — TELEPHONE (OUTPATIENT)
Dept: PRIMARY CARE | Facility: CLINIC | Age: 89
End: 2024-11-20
Payer: MEDICARE

## 2024-11-20 DIAGNOSIS — I48.0 PAROXYSMAL ATRIAL FIBRILLATION (MULTI): ICD-10-CM

## 2024-11-20 NOTE — TELEPHONE ENCOUNTER
Pls let them know PT orders will need to come from ortho surgeon   (I don't know weight bearing status, protocol for what her fractrure would require    I can sign for skilled nursing though, any other home care she would need

## 2024-11-20 NOTE — TELEPHONE ENCOUNTER
Tatianna from [facility] is calling to see if you will follow this pt for home care. Pt will need PT/OT and Skilled nursing. If ok a verbal can be left on Tatianna's vm. # [phone].

## 2024-11-21 ENCOUNTER — TELEPHONE (OUTPATIENT)
Dept: PRIMARY CARE | Facility: CLINIC | Age: 89
End: 2024-11-21
Payer: MEDICARE

## 2024-11-21 ENCOUNTER — PATIENT OUTREACH (OUTPATIENT)
Dept: PRIMARY CARE | Facility: CLINIC | Age: 89
End: 2024-11-21
Payer: MEDICARE

## 2024-11-21 NOTE — TELEPHONE ENCOUNTER
Irma De Paz, JANIE  [facility] Clerical    Can you please contact pt to schedule hosp  follow up within 14 days of discharge.  This patient was discharged from: [facility]  Discharge diagnosis:   hip fx  Date of discharge:  11/20/24     Thank you

## 2024-11-21 NOTE — PROGRESS NOTES
Discharge Facility:  [facility]   Discharge Diagnosis:  HIP FRACTURE   Admission Date:  11/17/24   Discharge Date:   11/20/24     PCP Appointment Date: 12/17/24  Specialist Appointment Date:   12/3/2024 2:30 PM [facility]   12/3/2024 3:00 PM Tonia Alfaro RN [facility]   Hospital Encounter and Summary Linked: Yes    Partial weight bearing 50% on operative leg    Medications  Medications reviewed with patient/caregiver?: Yes (BELINDA spoke with pt's COMPA) (11/21/2024  1:47 PM)  Is the patient having any side effects they believe may be caused by any medication additions or changes?: No (11/21/2024  1:47 PM)  Does the patient have all medications ordered at discharge?: Yes (11/21/2024  1:47 PM)  Care Management Interventions: No intervention needed (11/21/2024  1:47 PM)  Prescription Comments: New scripts gievn to pt at discharge : - Ultram, Colace, Iron, asa, lisinopril, and walker (11/21/2024  1:47 PM)  Is the patient taking all medications as directed (includes completed medication regime)?: Yes (11/21/2024  1:47 PM)  Medication Comments: Pt denies problems obtaining or affording medication  . (11/21/2024  1:47 PM)    Appointments  Does the patient have a primary care provider?: Yes (11/21/2024  1:47 PM)  Care Management Interventions: Advised patient to make appointment (11/21/2024  1:47 PM)  Has the patient kept scheduled appointments due by today?: Yes (11/21/2024  1:47 PM)    Self Management  What is the home health agency?: [facility] (11/21/2024  1:47 PM)  Has home health visited the patient within 72 hours of discharge?: Yes (COMPA spoke with today) (11/21/2024  1:47 PM)  What Durable Medical Equipment (DME) was ordered?: walker (11/21/2024  1:47 PM)  Has all Durable Medical Equipment (DME) been delivered?: Yes (11/21/2024  1:47 PM)    Patient Teaching  Does the patient have access to their discharge instructions?: Yes (11/21/2024  1:47 PM)  Care Management  Interventions: Reviewed instructions with patient (11/21/2024  1:47 PM)  What is the patient's perception of their health status since discharge?: Improving (11/21/2024  1:47 PM)  Is the patient/caregiver able to teach back the hierarchy of who to call/visit for symptoms/problems? PCP, Specialist, Home Health nurse, Urgent Care, ED, 911: Yes (11/21/2024  1:47 PM)  Patient/Caregiver Education Comments: PT COMPA returned my call. COMPA reports pt doing well at home. new meds reviewed with COMPA that is a nurse. COMPA staying with pt. pain controlled. . denies fever. pt up and walking and aware of 50 percent WB status. pt tolerating food.  pt using IS.  COMPA given my phone number to call [facility] any concerns, (11/21/2024  1:47 PM)

## 2024-11-22 RX ORDER — METOPROLOL SUCCINATE 25 MG/1
25 TABLET, EXTENDED RELEASE ORAL 2 TIMES DAILY
Qty: 180 TABLET | Refills: 0 | Status: SHIPPED | OUTPATIENT
Start: 2024-11-22

## 2024-11-22 NOTE — TELEPHONE ENCOUNTER
Pt only wanted to see her pcp. [facility] had no openings and pt refused to see another provider. Pt has an apt on 12/17/24.

## 2024-12-05 ENCOUNTER — PATIENT OUTREACH (OUTPATIENT)
Dept: PRIMARY CARE | Facility: CLINIC | Age: 89
End: 2024-12-05
Payer: MEDICARE

## 2024-12-17 ENCOUNTER — APPOINTMENT (OUTPATIENT)
Dept: PRIMARY CARE | Facility: CLINIC | Age: 89
End: 2024-12-17
Payer: MEDICARE

## 2024-12-17 VITALS
TEMPERATURE: 98.5 F | HEIGHT: 64 IN | DIASTOLIC BLOOD PRESSURE: 72 MMHG | HEART RATE: 73 BPM | BODY MASS INDEX: 21.68 KG/M2 | SYSTOLIC BLOOD PRESSURE: 139 MMHG | WEIGHT: 127 LBS | OXYGEN SATURATION: 96 %

## 2024-12-17 DIAGNOSIS — M85.80 OSTEOPENIA, UNSPECIFIED LOCATION: ICD-10-CM

## 2024-12-17 DIAGNOSIS — I10 HYPERTENSION, UNSPECIFIED TYPE: ICD-10-CM

## 2024-12-17 DIAGNOSIS — N18.31 CHRONIC KIDNEY DISEASE, STAGE 3A (MULTI): ICD-10-CM

## 2024-12-17 DIAGNOSIS — Z13.89 ENCOUNTER FOR SCREENING FOR OTHER DISORDER: ICD-10-CM

## 2024-12-17 DIAGNOSIS — Z00.00 MEDICARE ANNUAL WELLNESS VISIT, SUBSEQUENT: Primary | ICD-10-CM

## 2024-12-17 DIAGNOSIS — Z71.89 DNR (DO NOT RESUSCITATE) DISCUSSION: ICD-10-CM

## 2024-12-17 DIAGNOSIS — R60.0 EDEMA OF LEFT LOWER EXTREMITY: ICD-10-CM

## 2024-12-17 DIAGNOSIS — E78.5 HYPERLIPIDEMIA, UNSPECIFIED HYPERLIPIDEMIA TYPE: ICD-10-CM

## 2024-12-17 DIAGNOSIS — M79.605 TENDERNESS OF LOWER EXTREMITY, LEFT: ICD-10-CM

## 2024-12-17 PROBLEM — D62 ABLA (ACUTE BLOOD LOSS ANEMIA): Status: RESOLVED | Noted: 2024-11-19 | Resolved: 2024-12-17

## 2024-12-17 PROBLEM — R73.9 ELEVATED BLOOD SUGAR: Status: RESOLVED | Noted: 2023-09-05 | Resolved: 2024-12-17
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
## 2024-12-17 PROBLEM — E87.6 HYPOKALEMIA: Status: RESOLVED | Noted: 2023-09-19 | Resolved: 2024-12-17

## 2024-12-17 PROBLEM — I85.00 ESOPHAGEAL VARICES WITHOUT BLEEDING (MULTI): Status: RESOLVED | Noted: 2024-10-21 | Resolved: 2024-12-17

## 2024-12-17 PROBLEM — S52.509A DISTAL RADIAL FRACTURE: Status: RESOLVED | Noted: 2022-01-28 | Resolved: 2024-12-17

## 2024-12-17 PROBLEM — S72.142A CLOSED DISPLACED INTERTROCHANTERIC FRACTURE OF LEFT FEMUR: Status: ACTIVE | Noted: 2024-11-17

## 2024-12-17 PROBLEM — I48.11 LONGSTANDING PERSISTENT ATRIAL FIBRILLATION (MULTI): Status: ACTIVE | Noted: 2024-11-17

## 2024-12-17 PROCEDURE — 1123F ACP DISCUSS/DSCN MKR DOCD: CPT | Performed by: FAMILY MEDICINE

## 2024-12-17 PROCEDURE — 1159F MED LIST DOCD IN RCRD: CPT | Performed by: FAMILY MEDICINE

## 2024-12-17 PROCEDURE — 1170F FXNL STATUS ASSESSED: CPT | Performed by: FAMILY MEDICINE

## 2024-12-17 PROCEDURE — G0439 PPPS, SUBSEQ VISIT: HCPCS | Performed by: FAMILY MEDICINE

## 2024-12-17 PROCEDURE — 3078F DIAST BP <80 MM HG: CPT | Performed by: FAMILY MEDICINE

## 2024-12-17 PROCEDURE — 1036F TOBACCO NON-USER: CPT | Performed by: FAMILY MEDICINE

## 2024-12-17 PROCEDURE — 1160F RVW MEDS BY RX/DR IN RCRD: CPT | Performed by: FAMILY MEDICINE

## 2024-12-17 PROCEDURE — 99214 OFFICE O/P EST MOD 30 MIN: CPT | Performed by: FAMILY MEDICINE

## 2024-12-17 PROCEDURE — 3075F SYST BP GE 130 - 139MM HG: CPT | Performed by: FAMILY MEDICINE

## 2024-12-17 RX ORDER — ACETAMINOPHEN 500 MG
1000 TABLET ORAL EVERY 8 HOURS PRN
COMMUNITY
Start: 2024-11-20

## 2024-12-17 RX ORDER — FERROUS SULFATE 325(65) MG
325 TABLET ORAL
COMMUNITY
Start: 2024-11-21 | End: 2024-12-21

## 2024-12-17 ASSESSMENT — PATIENT HEALTH QUESTIONNAIRE - PHQ9
SUM OF ALL RESPONSES TO PHQ9 QUESTIONS 1 AND 2: 0
1. LITTLE INTEREST OR PLEASURE IN DOING THINGS: NOT AT ALL
2. FEELING DOWN, DEPRESSED OR HOPELESS: NOT AT ALL

## 2024-12-17 ASSESSMENT — ACTIVITIES OF DAILY LIVING (ADL)
MANAGING_FINANCES: INDEPENDENT
TAKING_MEDICATION: INDEPENDENT
DOING_HOUSEWORK: INDEPENDENT
BATHING: INDEPENDENT
DRESSING: INDEPENDENT
GROCERY_SHOPPING: INDEPENDENT

## 2024-12-17 NOTE — PATIENT INSTRUCTIONS
AT PHARMACY     GET SHINGLES SHOT SERIES OF 2       RSV ELIGIBLE     THERA BANDS - OVER THE HINGES TO STRENGTHEN BONES       DR. IBARRA       CHECK FOR DVT IN LEFT LEG       WILL START ON BLOOD THINNER IF POSITIVE,   CAUTION WITH PT'S HISTORY OF GI BLEED   IS ON PROTONIX DAILY     IF ON BLOOD THINNER, DO STOP THE BABY ASPIRIN DAILY      I WILL REACH OUT TO HEMATOLOGY FOR NEXT STEPS IF DVT POSITIVE     HTN - continue current management     Hx gi bleed -  on baby asa bid (162 mg)       Follow up in 6 mo Dr. Ibarra

## 2024-12-17 NOTE — ASSESSMENT & PLAN NOTE
NSR on exam today.   Stable, doing well since having the Watchman procedure 4/18/2023.      Presently Metoprolol 25 mg BID + aspirin daily.     She was previously on Xarelto and Eliquis but was d/c'ed 2/2 GI bleeding requiring transfusion.  Patient to take aspirin for life per cardio.    CCM and follow-up with cardio as they have recommended.

## 2024-12-17 NOTE — PROGRESS NOTES
Subjective   Reason for Visit: Camille Hatfield is an 89 y.o. female here for a Medicare Wellness visit.     Past Medical, Surgical, and Family History reviewed and updated in chart.    Reviewed all medications by prescribing practitioner or clinical pharmacist (such as prescriptions, OTCs, herbal therapies and supplements) and documented in the medical record.    HPI    Patient presents today for routine FUV + MWV  ABN was given to pt and signed, pt verbalized understanding.     Patient concerns:  No new concerns or issues, they are doing well overall.      MEDICARE WELLNESS VISIT   TDAP: none found  SHINGRIX: none found  PNEUMOVAX: completed  PAP: none found - graduated  MAMMO: none found - graduated  CSCOPE: none found - graduated  DEXA: 4/2018 osteopenia bilateral femoral necks (ordered 9/2023, not completed)  HEP C SCREEN: none found  CACS: none found  LIPID: 1/2020 TC/HDL ratio 5.4,     Diet: overall balanced  Exercise: not doing much strength, having limitations due to hip issues.  Alcohol use: none  Smoking: former smoker    Dental: utd  Vision: utd  Foot care: manages on her own, no issues    Advanced care: DNR discussion held, does not want resuscitation if found down, family is aware of her wishes.    Cervical cancer screening: graduated    Breast cancer screening: graduated    Colon cancer screening: graduated    Bone density screening: due - deferred, would not take treatment.    ROUTINE VISIT  CHRONIC CONDITIONS:   Hyponatremia  Chronic, as low as 126 in the past    Paroxysmal atrial fibrillation  Managed by cardiology, Dr. Ramicone.    S/p successful Transcatheter Left Atrial Appendage Closure with 27 mm Watchman FLX device on 4/18/2023    Presently Metoprolol 25 mg BID + aspirin daily.    She was previously on Xarelto and Eliquis but was d/c'ed 2/2 GI bleeding requiring transfusion. Patient to take aspirin for life per cardio.     No cardiac symptoms at this time.    HTN  Taking Lisinopril 10 mg  "+ Metoprolol 25 mg BID     Medications are being taken and tolerated well. No side effects are reported.  Patient is taking blood pressure outside of office and reports good control.  Patient denies chest pain, shortness of breath with exertion, palpitations, lower extremity edema, headache, or dizziness.     HLD  Lifestyle managed, unable to tolerate statins in past.  No CACS in our system.  1/2020 TC/HDL ratio 5.4,      Patient denies any facial droop, sudden vision loss, weakness or numbness on one side of body.   Patient denies chest pain, shortness of breath with exertion, palpitations, or symptoms of claudication.     CKD, stage III  11/2024 GFR 63, creatinine 0.88     Osteopenia  Hx of osteoporosis, she was on infusion, Reclast, for about 2 years around 2009/2010.  She was intolerant to Fosamax in remote past, due to GI upset.  Last DEXA done in 2018, patient previously declined further screenings as she would not take treatment medications if needed.    Taking vitamin D and calcium.      Patient Care Team:  Arelis Joyner DO as PCP - General  Irma De Paz RN as Care Manager (Case Management)     Review of Systems   All other systems reviewed and are negative.      Objective   Vitals:  /72 (BP Location: Right arm, Patient Position: Sitting, BP Cuff Size: Adult)   Pulse 73   Temp 36.9 °C (98.5 °F) (Temporal)   Ht 1.626 m (5' 4\")   Wt 57.6 kg (127 lb)   SpO2 96%   BMI 21.80 kg/m²       Physical Exam  Vitals and nursing note reviewed.   Constitutional:       General: She is not in acute distress.     Appearance: Normal appearance. She is not toxic-appearing.   HENT:      Head: Normocephalic and atraumatic.   Neck:      Thyroid: No thyromegaly.   Cardiovascular:      Rate and Rhythm: Normal rate and regular rhythm.      Heart sounds: Normal heart sounds. No murmur heard.     No friction rub. No gallop.   Pulmonary:      Effort: Pulmonary effort is normal.      Breath sounds: Normal " breath sounds. No wheezing, rhonchi or rales.   Abdominal:      General: Bowel sounds are normal. There is no distension.      Palpations: Abdomen is soft. There is no mass.      Tenderness: There is no abdominal tenderness. There is no guarding.   Musculoskeletal:      Right lower leg: No edema.      Left lower leg: Edema present.      Comments: Mild erythema, warmth, and tenderness distal left lower extremity. Positive Oniel's sign. No palpable chord. No skin breakdown.   Lymphadenopathy:      Cervical: No cervical adenopathy.   Skin:     General: Skin is warm and dry.   Neurological:      General: No focal deficit present.      Mental Status: She is alert and oriented to person, place, and time.   Psychiatric:         Mood and Affect: Mood normal.         Behavior: Behavior normal.         Assessment & Plan  Medicare annual wellness visit, subsequent  Vaccines and screenings reviewed.  Questionnaires completed.  Health and wellness topics reviewed.  Diet and exercise recommendations revisited.  Routine blood work ordered.    VACCINES:  -Flu vaccine declined  -TDAP is due and recommended, can get at pharmacy, no rx needed.  -Prevnar-20 vaccine previously completed, good for lifetime  -Shingles vaccine (Shingrix) is strongly encourage - can get at pharmacy - no prescription needed.  This should be covered starting January 2023 with Medicare per Executive Order.  This is to be administered in 2 separate doses, 2- 6 months apart.   This is a killed virus vaccine, so no risk of chicken pox or shingles with administration.   The vaccine is approximately 90 % effective to protect against shingles even 5 years out.   Side effects of the vaccine can include soreness of the arm at administration site and possible flu-like symptoms after administration.     SCREENINGS:  -Patient has aged out of routine breast, cervical, and colon cancer screenings, she continues to defer.  -Bone density screening (DEXA) discussed, defers  repeat at this time as has had tx for osteoporosis in past and would not take again.    LIFESTYLE MEASURES  -consider increasing protein intake provided no issues with kidneys to 1 gram per 1 pound of ideal body weight per not to exceed 150 gram per day. May have to supplement with a protein powder to achieve this goal.  -make sure you are avoiding refined carbs such as breads, pasta, cereal, candy, soda,  nutrition bars, granola, chips, and sugar sweetened beverages.      -eat 5- 7 servings daily of veggies,  healthy protein such as chicken, fish,  beans, and eggs, and include healthy fats in your diet such as seeds, nuts, olive oil, avocados, and salmon.   -exercise 4 - 6 days per week as you are able, 150 minutes total weekly divided up is recommended with 3-4 of those days including resistance/strength training.  -Vitamin D is recommended at 1000 - 5000 IU international units daily.   -Always wear sunscreen when you have sun exposure.  -64 oz of water is recommended daily.  -Dental visits recommended every 6 months.  -Eye exam recommended every 2 years, for those with vision problems every year.    Encounter for screening for other disorder    Body mass index (BMI) of 21.0 to 21.9 in adult         Edema of left lower extremity  Mild erythema, warmth, and tenderness distal left lower extremity. Positive Oniel's sign. No palpable chord. No skin breakdown.    Concern for lower extremity DVT - US of LLE ordered today for rule out.    Orders:    Lower extremity venous duplex left; Future    Tenderness of lower extremity, left    Orders:    Lower extremity venous duplex left; Future    DNR (do not resuscitate) discussion    Orders:    DNR and No Intubation    Osteopenia, unspecified location  Hx of osteoporosis, she was on infusion, Reclast, for about 2 years around 2009/2010.  She was intolerant to Fosamax in remote past, due to GI upset.  Last DEXA done in 2018, which showed osteopenia but not osteoporosis.  Patient  declines further screenings as she would not take treatment.    Recommend weightbearing exercise such as walking 4-6 days per week 30 minutes on those days.   Other strengthening exercises such as yoga and lifting weights can also be helpful.  Take vitamin D at 1000 IU daily and calcium at 1500 mg daily.         Hypertension, unspecified type  BP is in 130s/70s in office today. Goal BP is 130/80 or less, ideally 120/80 or less.   BP adequately controlled, no medication changes at this time.    Continue Lisinopril 10 mg daily - take 1/2 tablet (5 mg) in AM and 1/2 tablet (5 mg) in PM.  Continue Metoprolol 25 mg twice daily.    Continue to monitor labs routinely given hx of hyponatremia.         Chronic kidney disease, stage 3a (Multi)  11/2024 GFR 63, creatinine 0.88, this is baseline for patient.  Will continue to monitor on regular basis.  Revisited good BP control, avoiding NSAIDs, and adequate hydration as measures to further protect kidneys.          Hyperlipidemia, unspecified hyperlipidemia type  1/2020 TC/HDL ratio 5.4,   Goal TC/HDL ratio 3.4 or less, LDL 99 or less,  or less, and TRIG 150 or less.    Cholesterol elevated however patient unable to tolerate statins in past.  Diet and exercise recommendations revisited.                Advance Directives Discussion  16 - 20 minutes were spent discussing Advanced Care Planning (including a Living Will, Medical Power Of , as well as specific end of life choices and/or directives). The details of that discussion were documented in Advanced Directives Discussion section of the medical record.       Follow-up in 6 months for routine care.  Call for sooner follow-up if needed.           Scribe Attestation  By signing my name below, IKortney Scribe   attest that this documentation has been prepared under the direction and in the presence of Arelis Joyner DO.

## 2024-12-17 NOTE — ASSESSMENT & PLAN NOTE
BP is in 130s/70s in office today. Goal BP is 130/80 or less, ideally 120/80 or less.   BP adequately controlled, no medication changes at this time.    Continue Lisinopril 10 mg daily - take 1/2 tablet (5 mg) in AM and 1/2 tablet (5 mg) in PM.  Continue Metoprolol 25 mg twice daily.    Continue to monitor labs routinely given hx of hyponatremia.

## 2024-12-17 NOTE — ASSESSMENT & PLAN NOTE
Hx of osteoporosis, she was on infusion, Reclast, for about 2 years around 2009/2010.  She was intolerant to Fosamax in remote past, due to GI upset.  Last DEXA done in 2018, which showed osteopenia but not osteoporosis.  Patient declines further screenings as she would not take treatment.    Recommend weightbearing exercise such as walking 4-6 days per week 30 minutes on those days.   Other strengthening exercises such as yoga and lifting weights can also be helpful.  Take vitamin D at 1000 IU daily and calcium at 1500 mg daily.

## 2024-12-17 NOTE — ASSESSMENT & PLAN NOTE
11/2024 GFR 63, creatinine 0.88, this is baseline for patient.  Will continue to monitor on regular basis.  Revisited good BP control, avoiding NSAIDs, and adequate hydration as measures to further protect kidneys.

## 2024-12-17 NOTE — ASSESSMENT & PLAN NOTE
1/2020 TC/HDL ratio 5.4,   Goal TC/HDL ratio 3.4 or less, LDL 99 or less,  or less, and TRIG 150 or less.    Cholesterol elevated however patient unable to tolerate statins in past.  Diet and exercise recommendations revisited.

## 2024-12-17 NOTE — ASSESSMENT & PLAN NOTE
Vaccines and screenings reviewed.  Questionnaires completed.  Health and wellness topics reviewed.  Diet and exercise recommendations revisited.  Routine blood work ordered.    VACCINES:  -Flu vaccine declined  -TDAP is due and recommended, can get at pharmacy, no rx needed.  -Prevnar-20 vaccine previously completed, good for lifetime  -Shingles vaccine (Shingrix) is strongly encourage - can get at pharmacy - no prescription needed.  This should be covered starting January 2023 with Medicare per Executive Order.  This is to be administered in 2 separate doses, 2- 6 months apart.   This is a killed virus vaccine, so no risk of chicken pox or shingles with administration.   The vaccine is approximately 90 % effective to protect against shingles even 5 years out.   Side effects of the vaccine can include soreness of the arm at administration site and possible flu-like symptoms after administration.     SCREENINGS:  -Patient has aged out of routine breast, cervical, and colon cancer screenings, she continues to defer.  -Bone density screening (DEXA) discussed, defers repeat at this time as has had tx for osteoporosis in past and would not take again.    LIFESTYLE MEASURES  -consider increasing protein intake provided no issues with kidneys to 1 gram per 1 pound of ideal body weight per not to exceed 150 gram per day. May have to supplement with a protein powder to achieve this goal.  -make sure you are avoiding refined carbs such as breads, pasta, cereal, candy, soda,  nutrition bars, granola, chips, and sugar sweetened beverages.      -eat 5- 7 servings daily of veggies,  healthy protein such as chicken, fish,  beans, and eggs, and include healthy fats in your diet such as seeds, nuts, olive oil, avocados, and salmon.   -exercise 4 - 6 days per week as you are able, 150 minutes total weekly divided up is recommended with 3-4 of those days including resistance/strength training.  -Vitamin D is recommended at 1000 - 5000  IU international units daily.   -Always wear sunscreen when you have sun exposure.  -64 oz of water is recommended daily.  -Dental visits recommended every 6 months.  -Eye exam recommended every 2 years, for those with vision problems every year.

## 2024-12-18 ENCOUNTER — HOSPITAL ENCOUNTER (OUTPATIENT)
Dept: RADIOLOGY | Facility: CLINIC | Age: 89
Discharge: HOME | End: 2024-12-18
Payer: MEDICARE

## 2024-12-18 ENCOUNTER — APPOINTMENT (OUTPATIENT)
Dept: VASCULAR MEDICINE | Facility: CLINIC | Age: 89
End: 2024-12-18
Payer: MEDICARE

## 2024-12-18 DIAGNOSIS — M79.605 TENDERNESS OF LOWER EXTREMITY, LEFT: ICD-10-CM

## 2024-12-18 DIAGNOSIS — R60.0 EDEMA OF LEFT LOWER EXTREMITY: ICD-10-CM

## 2024-12-18 PROCEDURE — 93971 EXTREMITY STUDY: CPT

## 2024-12-18 PROCEDURE — 93971 EXTREMITY STUDY: CPT | Performed by: RADIOLOGY

## 2025-01-06 ENCOUNTER — PATIENT OUTREACH (OUTPATIENT)
Dept: PRIMARY CARE | Facility: CLINIC | Age: OVER 89
End: 2025-01-06
Payer: MEDICARE

## 2025-01-22 DIAGNOSIS — I48.0 PAROXYSMAL ATRIAL FIBRILLATION (MULTI): ICD-10-CM

## 2025-01-24 RX ORDER — METOPROLOL SUCCINATE 25 MG/1
25 TABLET, EXTENDED RELEASE ORAL 2 TIMES DAILY
Qty: 180 TABLET | Refills: 3 | Status: SHIPPED | OUTPATIENT
Start: 2025-01-24

## 2025-02-06 ENCOUNTER — PATIENT OUTREACH (OUTPATIENT)
Dept: PRIMARY CARE | Facility: CLINIC | Age: OVER 89
End: 2025-02-06
Payer: MEDICARE

## 2025-05-25 DIAGNOSIS — I48.0 PAROXYSMAL ATRIAL FIBRILLATION (MULTI): ICD-10-CM

## 2025-05-29 RX ORDER — PANTOPRAZOLE SODIUM 40 MG/1
40 TABLET, DELAYED RELEASE ORAL DAILY
Qty: 90 TABLET | Refills: 3 | Status: SHIPPED | OUTPATIENT
Start: 2025-05-29

## 2025-06-04 DIAGNOSIS — I10 PRIMARY HYPERTENSION: ICD-10-CM

## 2025-06-07 PROBLEM — G62.9 NEUROPATHY: Status: ACTIVE | Noted: 2025-06-07

## 2025-06-07 PROBLEM — M81.0 OSTEOPOROSIS: Status: ACTIVE | Noted: 2023-09-05

## 2025-06-07 PROBLEM — R53.1 WEAKNESS: Status: ACTIVE | Noted: 2025-03-29

## 2025-06-07 PROBLEM — M89.319 ENLARGEMENT OF CLAVICLE: Status: ACTIVE | Noted: 2025-06-07

## 2025-06-07 PROBLEM — G45.9 TIA (TRANSIENT ISCHEMIC ATTACK): Status: ACTIVE | Noted: 2025-03-29

## 2025-06-07 PROBLEM — D72.829 LEUKOCYTOSIS: Status: ACTIVE | Noted: 2025-03-29

## 2025-06-07 PROBLEM — Z66 DNR (DO NOT RESUSCITATE): Status: ACTIVE | Noted: 2025-06-07

## 2025-06-07 PROBLEM — N28.9 LOW KIDNEY FUNCTION: Status: ACTIVE | Noted: 2025-06-07

## 2025-06-07 RX ORDER — LISINOPRIL 10 MG/1
10 TABLET ORAL DAILY
Qty: 90 TABLET | Refills: 3 | Status: SHIPPED | OUTPATIENT
Start: 2025-06-07

## 2025-06-07 NOTE — TELEPHONE ENCOUNTER
BRIEF NOTE    Subjective/Objective:  Pharmacy refill request. Labs reviewed.     Assessment/Plan:  1. Primary hypertension  - lisinopril 10 mg tablet; Take 1 tablet (10 mg) by mouth once daily.  Dispense: 90 tablet; Refill: 3      No problem-specific Assessment & Plan notes found for this encounter.        Alejandra Ibarra DO, MSEd  [facility]  Office: [phone]  6/7/2025 9:59 AM

## 2025-06-18 ENCOUNTER — APPOINTMENT (OUTPATIENT)
Dept: PRIMARY CARE | Facility: CLINIC | Age: OVER 89
End: 2025-06-18
Payer: MEDICARE

## 2025-06-18 ENCOUNTER — TELEPHONE (OUTPATIENT)
Dept: PHARMACY | Facility: HOSPITAL | Age: OVER 89
End: 2025-06-18

## 2025-06-18 NOTE — TELEPHONE ENCOUNTER
Population Health: Outreach by Ambulatory Pharmacy Team    Patient: Camille Hatfield  Primary Care Provider (PCP): Alejandra Ibarra DO  Payor: [facility]  Reason: Adherence  Medication(s): ATORVASTATIN TAB 40MG  Outcome: Left Voicemail    DEN EWING   Pharmacy Intern

## 2025-09-02 ENCOUNTER — APPOINTMENT (OUTPATIENT)
Dept: PRIMARY CARE | Facility: CLINIC | Age: OVER 89
End: 2025-09-02
Payer: MEDICARE

## 2025-09-02 PROBLEM — I63.81 CEREBROVASCULAR ACCIDENT (CVA) DUE TO OCCLUSION OF SMALL ARTERY (MULTI): Status: ACTIVE | Noted: 2025-09-02

## 2025-09-02 PROBLEM — I35.1 AORTIC VALVE REGURGITATION: Status: ACTIVE | Noted: 2025-09-02

## 2025-09-02 PROBLEM — M17.0 PRIMARY OSTEOARTHRITIS OF BOTH KNEES: Status: RESOLVED | Noted: 2023-09-05 | Resolved: 2025-09-02

## 2025-09-02 PROBLEM — M25.561 RIGHT KNEE PAIN: Status: RESOLVED | Noted: 2023-09-05 | Resolved: 2025-09-02

## 2025-09-03 ENCOUNTER — RESULTS FOLLOW-UP (OUTPATIENT)
Dept: PRIMARY CARE | Facility: CLINIC | Age: OVER 89
End: 2025-09-03
Payer: MEDICARE

## 2025-09-03 DIAGNOSIS — R31.29 MICROSCOPIC HEMATURIA: Primary | ICD-10-CM

## 2025-09-03 LAB
NON-UH HIE A/G RATIO: 1.2
NON-UH HIE ALB: 3.8 G/DL (ref 3.4–5)
NON-UH HIE ALK PHOS: 90 UNIT/L (ref 45–117)
NON-UH HIE APPEARANCE, U: ABNORMAL
NON-UH HIE BASO COUNT: 0.02 X1000 (ref 0–0.2)
NON-UH HIE BASOS %: 0.4 %
NON-UH HIE BILIRUBIN, TOTAL: 0.8 MG/DL (ref 0.3–1.2)
NON-UH HIE BILIRUBIN, U: ABNORMAL
NON-UH HIE BLOOD, U: ABNORMAL
NON-UH HIE BUN/CREAT RATIO: 30
NON-UH HIE BUN: 30 MG/DL (ref 9–23)
NON-UH HIE CALCIUM: 9.7 MG/DL (ref 8.7–10.4)
NON-UH HIE CALCULATED LDL CHOLESTEROL: 145 MG/DL (ref 60–130)
NON-UH HIE CALCULATED OSMOLALITY: 271 MOSM/KG (ref 275–295)
NON-UH HIE CHLORIDE: 98 MMOL/L (ref 98–107)
NON-UH HIE CHOLESTEROL: 214 MG/DL (ref 100–200)
NON-UH HIE CO2, VENOUS: 27 MMOL/L (ref 20–31)
NON-UH HIE COLOR, U: ABNORMAL
NON-UH HIE CREATININE: 1 MG/DL (ref 0.5–0.8)
NON-UH HIE DIFF?: NORMAL
NON-UH HIE EOS COUNT: 0.1 X1000 (ref 0–0.5)
NON-UH HIE EOSIN %: 1.4 %
NON-UH HIE GFR AA: >60
NON-UH HIE GLOBULIN: 3.1 G/DL
NON-UH HIE GLOMERULAR FILTRATION RATE: 52 ML/MIN/1.73M?
NON-UH HIE GLUCOSE QUAL, U: ABNORMAL
NON-UH HIE GLUCOSE: 93 MG/DL (ref 74–106)
NON-UH HIE GOT: 16 UNIT/L (ref 15–37)
NON-UH HIE GPT: 12 UNIT/L (ref 10–49)
NON-UH HIE HCT: 39.5 % (ref 36–46)
NON-UH HIE HDL CHOLESTEROL: 48 MG/DL (ref 40–60)
NON-UH HIE HGB A1C: 5.4 %
NON-UH HIE HGB: 13.2 G/DL (ref 12–16)
NON-UH HIE INSTR WBC: 6.6
NON-UH HIE K: 4.8 MMOL/L (ref 3.5–5.1)
NON-UH HIE KETONES, U: ABNORMAL
NON-UH HIE LEUKOCYTE ESTERASE, U: ABNORMAL
NON-UH HIE LYMPH %: 25.4 %
NON-UH HIE LYMPH COUNT: 1.66 X1000 (ref 1.2–4.8)
NON-UH HIE MCH: 31.2 PG (ref 27–34)
NON-UH HIE MCHC: 33.5 G/DL (ref 32–37)
NON-UH HIE MCV: 93.1 FL (ref 80–100)
NON-UH HIE MONO %: 7 %
NON-UH HIE MONO COUNT: 0.46 X1000 (ref 0.1–1)
NON-UH HIE MPV: 8.3 FL (ref 7.4–10.4)
NON-UH HIE NA: 132 MMOL/L (ref 135–145)
NON-UH HIE NEUTROPHIL %: 65.9 %
NON-UH HIE NEUTROPHIL COUNT (ANC): 4.32 X1000 (ref 1.4–8.8)
NON-UH HIE NITRITE, U: ABNORMAL
NON-UH HIE NUCLEATED RBC: 0 /100WBC
NON-UH HIE PH, U: ABNORMAL (ref 4.5–8)
NON-UH HIE PLATELET: 231 X10 (ref 150–450)
NON-UH HIE PROTEIN, U: ABNORMAL
NON-UH HIE RBC/HPF, U: 4 #/HPF (ref 0–3)
NON-UH HIE RBC: 4.24 X10 (ref 4.2–5.4)
NON-UH HIE RDW: 13.1 % (ref 11.5–14.5)
NON-UH HIE SPECIFIC GRAVITY, U: ABNORMAL (ref 1–1.03)
NON-UH HIE TOTAL CHOL/HDL CHOL RATIO: 4.5
NON-UH HIE TOTAL PROTEIN: 6.9 G/DL (ref 5.7–8.2)
NON-UH HIE TRIGLYCERIDES: 105 MG/DL (ref 30–150)
NON-UH HIE TSH: 1.43 UIU/ML (ref 0.55–4.78)
NON-UH HIE U MICRO: ABNORMAL
NON-UH HIE UROBILINOGEN QUAL, U: ABNORMAL
NON-UH HIE WBC: 6.6 X10 (ref 4.5–11)

## 2026-03-09 ENCOUNTER — APPOINTMENT (OUTPATIENT)
Dept: PRIMARY CARE | Facility: CLINIC | Age: OVER 89
End: 2026-03-09
Payer: MEDICARE